# Patient Record
Sex: FEMALE | Race: WHITE | NOT HISPANIC OR LATINO | Employment: OTHER | URBAN - METROPOLITAN AREA
[De-identification: names, ages, dates, MRNs, and addresses within clinical notes are randomized per-mention and may not be internally consistent; named-entity substitution may affect disease eponyms.]

---

## 2023-10-23 ENCOUNTER — OFFICE VISIT (OUTPATIENT)
Dept: FAMILY MEDICINE CLINIC | Facility: CLINIC | Age: 58
End: 2023-10-23
Payer: COMMERCIAL

## 2023-10-23 VITALS
BODY MASS INDEX: 19.24 KG/M2 | DIASTOLIC BLOOD PRESSURE: 82 MMHG | OXYGEN SATURATION: 98 % | WEIGHT: 108.6 LBS | HEART RATE: 85 BPM | TEMPERATURE: 98.6 F | SYSTOLIC BLOOD PRESSURE: 120 MMHG | HEIGHT: 63 IN | RESPIRATION RATE: 20 BRPM

## 2023-10-23 DIAGNOSIS — Z12.83 SKIN CANCER SCREENING: ICD-10-CM

## 2023-10-23 DIAGNOSIS — F98.8 ATTENTION DEFICIT DISORDER, UNSPECIFIED HYPERACTIVITY PRESENCE: Primary | ICD-10-CM

## 2023-10-23 DIAGNOSIS — E78.2 MIXED HYPERLIPIDEMIA: ICD-10-CM

## 2023-10-23 DIAGNOSIS — L71.9 ROSACEA: ICD-10-CM

## 2023-10-23 DIAGNOSIS — Z12.31 ENCOUNTER FOR SCREENING MAMMOGRAM FOR MALIGNANT NEOPLASM OF BREAST: ICD-10-CM

## 2023-10-23 DIAGNOSIS — Z12.4 ENCOUNTER FOR SCREENING FOR CERVICAL CANCER: ICD-10-CM

## 2023-10-23 DIAGNOSIS — Z00.00 ENCOUNTER FOR MEDICAL EXAMINATION TO ESTABLISH CARE: ICD-10-CM

## 2023-10-23 RX ORDER — MULTIPLE VITAMINS W/ MINERALS TAB 9MG-400MCG
1 TAB ORAL DAILY
COMMUNITY

## 2023-10-23 RX ORDER — METHYLPHENIDATE HYDROCHLORIDE 30 MG/1
1 CAPSULE, EXTENDED RELEASE ORAL DAILY
COMMUNITY
Start: 2023-10-09

## 2023-10-23 NOTE — PROGRESS NOTES
"Chief Complaint  Establish Care (Immunization records can be obtained from [facility].)    Subjective          Ainsley Elise presents to [facility]  History of Present Illness  Patient is a 58-year-old female.  She is here to establish care with a new provider.  She moved to the area a couple years ago and has not established locally.   She has ADD and follows with psychiatry but needs to establish with a local psychiatrist. She is currently on metadate CD.   She has roseaca-no current treatment. Mixed hyperlipidemia - diet controlled.               The following portions of the patient's history were reviewed and updated as appropriate: allergies, current medications, past family history, past medical history, past social history, past surgical history and problem list.    Review of Systems   Constitutional: Negative.    HENT: Negative.     Eyes: Negative.    Respiratory: Negative.     Cardiovascular: Negative.    Gastrointestinal: Negative.    Genitourinary: Negative.    Musculoskeletal: Negative.    Skin:  Positive for color change.        Rosacea    Neurological: Negative.    Psychiatric/Behavioral:  Positive for decreased concentration.         ADD         Objective   Vital Signs:   /82   Pulse 85   Temp 98.6 °F (37 °C) (Infrared)   Resp 20   Ht 160 cm (63\")   Wt 49.3 kg (108 lb 9.6 oz)   SpO2 98%   BMI 19.24 kg/m²    BMI is within normal parameters. No other follow-up for BMI required.      PHQ-2/9 Depression Screening  PHQ-9 Total Score: 0    TAMMY-7 Anxiety Screening  TAMMY-7  Feeling nervous, anxious or on edge: Not at all  Not being able to stop or control worrying: Not at all  Worrying too much about different things: Not at all  Trouble Relaxing: Not at all  Being so restless that it is hard to sit still: Not at all  Feeling afraid as if something awful might happen: Not at all  Becoming easily annoyed or irritable: Not at all  TAMMY 7 Total " Score: 0  If you checked any problems, how difficult have these problems made it for you to do your work, take care of things at home, or get along with other people: Not difficult at all          Physical Exam  Vitals reviewed.   Constitutional:       Appearance: She is well-developed. She is not ill-appearing.   HENT:      Head: Normocephalic.      Right Ear: Tympanic membrane, ear canal and external ear normal.      Left Ear: Tympanic membrane, ear canal and external ear normal.      Nose: Nose normal.      Mouth/Throat:      Mouth: Mucous membranes are moist.   Eyes:      Conjunctiva/sclera: Conjunctivae normal.      Pupils: Pupils are equal, round, and reactive to light.   Cardiovascular:      Rate and Rhythm: Normal rate and regular rhythm.      Heart sounds: Normal heart sounds.   Pulmonary:      Effort: Pulmonary effort is normal.      Breath sounds: Normal breath sounds.   Abdominal:      General: Bowel sounds are normal.      Palpations: Abdomen is soft.   Musculoskeletal:         General: Normal range of motion.      Cervical back: Normal range of motion.   Lymphadenopathy:      Cervical: No cervical adenopathy.   Skin:     General: Skin is warm and dry.      Capillary Refill: Capillary refill takes less than 2 seconds.      Findings: Rash present.      Comments: Rosacea      Neurological:      Mental Status: She is alert and oriented to person, place, and time.   Psychiatric:         Mood and Affect: Mood normal.         Speech: Speech normal.         Behavior: Behavior normal. Behavior is cooperative.         Thought Content: Thought content normal.         Judgment: Judgment normal.        Result Review :                 Assessment and Plan    Diagnoses and all orders for this visit:    1. Attention deficit disorder, unspecified hyperactivity presence (Primary)  -     Ambulatory Referral to Psychiatry    2. Encounter for medical examination to establish care  -     CBC & Differential; Future  -      Comprehensive Metabolic Panel; Future  -     Hemoglobin A1c; Future  -     Lipid Panel; Future  -     Vitamin D,25-Hydroxy; Future  -     Urinalysis With Culture If Indicated - Urine, Clean Catch; Future  -     TSH Rfx On Abnormal To Free T4; Future    3. Encounter for screening mammogram for malignant neoplasm of breast  -     Mammo Screening Digital Tomosynthesis Bilateral With CAD; Future    4. Skin cancer screening  -     Ambulatory Referral to Dermatology    5. Encounter for screening for cervical cancer  -     Ambulatory Referral to Obstetrics / Gynecology    6. Rosacea    7. Mixed hyperlipidemia    Referral to dermatology - skin cancer screening   Referral to GYN   Mammogram  Checking labs   Referral to psychiatry    Follow Up   Return in about 6 months (around 4/23/2024), or if symptoms worsen or fail to improve.  Patient was given instructions and counseling regarding her condition or for health maintenance advice. Please see specific information pulled into the AVS if appropriate.

## 2023-11-04 PROBLEM — E78.2 MIXED HYPERLIPIDEMIA: Status: ACTIVE | Noted: 2023-11-04

## 2023-12-12 ENCOUNTER — OFFICE VISIT (OUTPATIENT)
Age: 58
End: 2023-12-12
Payer: COMMERCIAL

## 2023-12-12 VITALS
HEART RATE: 59 BPM | DIASTOLIC BLOOD PRESSURE: 82 MMHG | BODY MASS INDEX: 20 KG/M2 | WEIGHT: 112.9 LBS | HEIGHT: 63 IN | OXYGEN SATURATION: 99 % | SYSTOLIC BLOOD PRESSURE: 126 MMHG

## 2023-12-12 DIAGNOSIS — Z51.81 ENCOUNTER FOR THERAPEUTIC DRUG MONITORING: ICD-10-CM

## 2023-12-12 DIAGNOSIS — F90.0 ADHD (ATTENTION DEFICIT HYPERACTIVITY DISORDER), INATTENTIVE TYPE: Primary | ICD-10-CM

## 2023-12-12 PROCEDURE — 90792 PSYCH DIAG EVAL W/MED SRVCS: CPT | Performed by: STUDENT IN AN ORGANIZED HEALTH CARE EDUCATION/TRAINING PROGRAM

## 2023-12-12 RX ORDER — METHYLPHENIDATE HYDROCHLORIDE 30 MG/1
30 CAPSULE, EXTENDED RELEASE ORAL DAILY
Qty: 30 CAPSULE | Refills: 0 | Status: SHIPPED | OUTPATIENT
Start: 2023-12-12

## 2023-12-12 NOTE — PROGRESS NOTES
"    New Patient Office Visit      Date: 2023  Patient Name: Ainsley Elise  : 1965   MRN: [id number]     Referring Provider: Carrie Maria APRN    Chief Complaint:      ICD-10-CM ICD-9-CM   1. ADHD (attention deficit hyperactivity disorder), inattentive type  F90.0 314.00   2. Encounter for therapeutic drug monitoring  Z51.81 V58.83        History of Present Illness:   Ainsley Elise is a 58 y.o. female who is here today for intake appointment.     Patient is seen and evaluated in the office.  She appears to be in no acute distress at this time.  She is pleasant, calm, and cooperative with the evaluation, and exhibits a linear and goal-directed thought process.  Patient states that she was referred to behavioral health by her primary care physician for ongoing treatment of ADHD.  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  She describes having a lot of teaching, but that it was very time-consuming for her.  It was difficult for her to get her lesson plans completed.  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  She often had to come up with 45-minute lesson plans, and she describes it is taking much longer than it should have.  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  She was helping make welcome packets.  Patient describes being told what to do in the order to keep things in, and feeling as though she was having difficulty keeping up and making frequent mistakes.  She spoke with her primary care regarding these symptoms during her regular physical.  Primary care diagnosed her with ADHD and started prescribing her Metadate.  Patient states that this is the only medication she has tried, and \" it was like a miracle\".  Where prior to starting the medication things would get very overwhelming and follow-up, she states that she is feeling much more settled down now and is able to keep " up with tasks.  Her   of suicide 4 years ago.  After he , she describes maintenance in their home becoming too much for her so she moved to [facility].  She describes relying on her milagros and her family to get her through this situation.  A month after her  , before quarantine started, she and her daughter went to their beach house and stayed there for 3 months.  Patient states that she was a runner.  She would often run 10 miles a day and volunteer there.  These things are to stress relief for her and helped her through her difficult time.  She describes having a good close group of single friends in [place].  She ended up moving to [place] 4 months ago because her kids and her granddaughter live here.  Since moving, she admits that she misses her home in [place] [facility] and her friends there, but she keeps busy and gets a lot of socialization with girlfriends here and family.  She describes her mood as typically being happy.  She denies symptoms of depression.  Sleep is okay overall even though menopause hindered this some.  She has a good appetite; eats healthy and runs.  She states that anxiety was higher after her  , but this has gotten better.  She denies history of thee, auditory or visual hallucinations.  She does not elicit any signs of psychosis.    Patient is happy with current medications and has been stable on this for 9 years.  She has previously been able to receive this medication from primary care, and voices concern over cost of follow-up at specialists visit.  Since she has been stable, we will stick to follow-up every 6 months.  Patient will call in for refills in the meantime.    Subjective      Review of Systems:   Review of Systems   Constitutional:  Negative for chills, fatigue and fever.   HENT:  Negative for congestion, hearing loss, sore throat and trouble swallowing.    Eyes:  Negative for blurred vision and double vision.    Respiratory:  Negative for cough, chest tightness and shortness of breath.    Cardiovascular:  Negative for chest pain and palpitations.   Gastrointestinal:  Negative for abdominal pain, constipation, diarrhea, nausea and vomiting.   Endocrine: Negative for polydipsia and polyuria.   Genitourinary:  Negative for hematuria and urgency.   Musculoskeletal:  Negative for arthralgias.   Skin:  Negative for skin lesions and bruise.   Neurological:  Negative for dizziness, tremors, seizures and light-headedness.   Hematological:  Negative for adenopathy.     Screening Scores:   PHQ-9 : 1  TAMMY-7 : 2    Past Psychiatric History:   History of outpatient psychiatrist: denies  History of outpatient therapy: saw a therapist briefly after her  passed away  Previous Inpatient hospitalizations: denies  Previous medication trials: none  History of suicide/self harm attempts: denies     Abuse/trauma History:              Physical: denies              Sexual: denies              Emotional/Neglect: denies              Significant death/loss:   of suicide 4 years ago              Other trauma: denies                Substance Abuse History:              Alcohol: 2-4 drinks per week              Tobacco/Vape: denies              Illicit Drugs: denies                Legal History:   denies     Social History:  Where was patient born: [facility]  Where does patient currently live: [place]  Highest level of education obtained: college  Living situation: lives alone  Patient's Occupation: retired teacher  Leisure and Recreation: spends time with family/friends  Support system: family/friends  Temple: [employer]     Family History:   Family History   Problem Relation Age of Onset    Asthma Father     Asthma Brother     Epilepsy Brother     Cancer Brother      Psychiatric History:   Psych Diagnosis: denies  History of suicide/self harm attempts:   of suicide 4 years ago  History of Substance abuse:  "denies    Past Medical History:   Past Medical History:   Diagnosis Date    ADHD (attention deficit hyperactivity disorder)      Past Surgical History:   Past Surgical History:   Procedure Laterality Date     SECTION      WISDOM TOOTH EXTRACTION       Medications:     Current Outpatient Medications:     methylphenidate CD (METADATE CD) 30 MG CR capsule, Take 1 capsule by mouth Daily, Disp: , Rfl:     multivitamin with minerals tablet tablet, Take 1 tablet by mouth Daily., Disp: , Rfl:     Medication Considerations:  PRITESH reviewed and appropriate.      Allergies:   No Known Allergies      Objective     Physical Exam:  Vital Signs:   Vitals:    23 1255   BP: 126/82   Pulse: 59   SpO2: 99%   Weight: 51.2 kg (112 lb 14.4 oz)   Height: 160 cm (62.99\")     Body mass index is 20 kg/m².     Mental Status Exam:   MENTAL STATUS EXAM   General Appearance:  Cleanly groomed and dressed  Eye Contact:  Good eye contact  Attitude:  Cooperative  Motor Activity:  Normal gait, posture  Muscle Strength:  Normal  Speech:  Normal rate, tone, volume  Language:  Spontaneous  Mood and affect:  Normal, pleasant and appropriate  Hopelessness:  Denies  Thought Process:  Logical and goal-directed  Associations/ Thought Content:  No delusions  Hallucinations:  None  Suicidal Ideations:  Not present  Homicidal Ideation:  Not present  Sensorium:  Alert  Orientation:  Person, place, time and situation  Immediate Recall, Recent, and Remote Memory:  Intact  Attention Span/ Concentration:  Good  Fund of Knowledge:  Appropriate for age and educational level  Intellectual Functioning:  Average range  Insight:  Fair  Judgement:  Fair  Reliability:  Fair  Impulse Control:  Fair     SUICIDE RISK ASSESSMENT/CSSRS:  1. Does patient have thoughts of suicide? denies  2. Does patient have intent for suicide? denies  3. Does patient have a current plan for suicide? denies  4. History of suicide attempts: denies  5. Family history of suicide or " attempts:   of suicide 4 years ago  6. History of violent behaviors towards others or property or thoughts of committing suicide: denies  7. History of sexual aggression toward others: denies  8. Access to firearms or weapons: denies    Assessment / Plan      Visit Diagnosis/Orders Placed This Visit:  Diagnoses and all orders for this visit:    1. ADHD (attention deficit hyperactivity disorder), inattentive type (Primary)  2. Encounter for therapeutic drug monitoring  - UDS obtained today  - Continue Metadate 30 mg po daily  - Follow up with writer in 6 mo  Chart Reviewed     Functional Status: Mild impairment     Prognosis: Fair with Ongoing Treatment     Impression/Formulation:  Patient appeared alert and oriented.  Patient is voluntarily requesting to begin outpatient treatment at [facility] [address].  Patient is receptive to assistance with maintaining a stable lifestyle.  Patient presents with history of     ICD-10-CM ICD-9-CM   1. ADHD (attention deficit hyperactivity disorder), inattentive type  F90.0 314.00   2. Encounter for therapeutic drug monitoring  Z51.81 V58.83     Treatment Plan:     Patient will continue supportive psychotherapy efforts and medications as indicated. Clinic will obtain release of information for current treatment team for continuity of care as needed. Patient will contact this office, call 911 or present to the nearest emergency room should suicidal or homicidal ideations occur. Discussed medication options and treatment plan of prescribed medication(s) as well as the risks, benefits, and potential side effects. Patient ackowledged and verbally consented to continue with current treatment plan and was educated on the importance of compliance with treatment and follow-up appointments.     Follow Up:   Return in about 6 months (around 2024) for Medication Management.    Quality Measures:   Tobacco: Ainsley COLTON Elise  reports that she has never  smoked. She has never used smokeless tobacco.    Indiana Beaver MD   [facility] [address]     This is electronically signed by Indiana Beaver MD  12/12/2023 12:53 EST

## 2023-12-17 LAB
1OH-MIDAZOLAM UR QL SCN: NOT DETECTED NG/MG CREAT
6MAM UR QL SCN: NEGATIVE NG/ML
7AMINOCLONAZEPAM/CREAT UR: NOT DETECTED NG/MG CREAT
A-OH ALPRAZ/CREAT UR: NOT DETECTED NG/MG CREAT
A-OH-TRIAZOLAM/CREAT UR CFM: NOT DETECTED NG/MG CREAT
ACP UR QL CFM: NOT DETECTED
ALPRAZ/CREAT UR CFM: NOT DETECTED NG/MG CREAT
AMPHETAMINES UR QL SCN: NEGATIVE NG/ML
APAP UR QL SCN: NEGATIVE UG/ML
BARBITURATES UR QL SCN: NEGATIVE NG/ML
BENZODIAZ SCN METH UR: NEGATIVE
BUPRENORPHINE UR QL SCN: NEGATIVE
BUPRENORPHINE/CREAT UR: NOT DETECTED NG/MG CREAT
CANNABINOIDS UR QL CFM: NORMAL
CANNABINOIDS UR QL SCN: NORMAL NG/ML
CARBOXYTHC UR CFM-MCNC: 38 NG/MG CREAT
CARISOPRODOL UR QL: NEGATIVE NG/ML
CLONAZEPAM/CREAT UR CFM: NOT DETECTED NG/MG CREAT
COCAINE+BZE UR QL SCN: NEGATIVE NG/ML
CREAT UR-MCNC: 26 MG/DL
D-METHORPHAN UR-MCNC: NOT DETECTED NG/ML
D-METHORPHAN+LEVORPHANOL UR QL: NOT DETECTED
DESALKYLFLURAZ/CREAT UR: NOT DETECTED NG/MG CREAT
DIAZEPAM/CREAT UR: NOT DETECTED NG/MG CREAT
ETG ETS UR QL CFM: NORMAL
ETHANOL UR QL SCN: NEGATIVE G/DL
ETHANOL UR QL SCN: NORMAL NG/ML
ETHYL GLUCURONIDE UR CFM-MCNC: 3046 NG/MG CREAT
ETHYL SULFATE UR CFM-MCNC: 1277 NG/MG CREAT
FENTANYL CTO UR SCN-MCNC: NEGATIVE NG/ML
FENTANYL/CREAT UR: NOT DETECTED NG/MG CREAT
FLUNITRAZEPAM UR QL SCN: NOT DETECTED NG/MG CREAT
GABAPENTIN UR-MCNC: NEGATIVE UG/ML
HYPNOTICS UR QL SCN: NEGATIVE
KETAMINE UR QL: NOT DETECTED
LORAZEPAM/CREAT UR: NOT DETECTED NG/MG CREAT
MEPERIDINE UR QL SCN: NEGATIVE NG/ML
METHADONE UR QL SCN: NEGATIVE NG/ML
METHADONE+METAB UR QL SCN: NEGATIVE NG/ML
MIDAZOLAM/CREAT UR CFM: NOT DETECTED NG/MG CREAT
MISCELLANEOUS, UR: NEGATIVE
NORBUPRENORPHINE/CREAT UR: NOT DETECTED NG/MG CREAT
NORDIAZEPAM/CREAT UR: NOT DETECTED NG/MG CREAT
NORFENTANYL/CREAT UR: NOT DETECTED NG/MG CREAT
NORFLUNITRAZEPAM UR-MCNC: NOT DETECTED NG/MG CREAT
NORKETAMINE UR-MCNC: NOT DETECTED UG/ML
OPIATES UR SCN-MCNC: NEGATIVE NG/ML
OTHER HALLUCINOGENS UR: NEGATIVE
OXAZEPAM/CREAT UR: NOT DETECTED NG/MG CREAT
OXYCODONE CTO UR SCN-MCNC: NEGATIVE NG/ML
PCP UR QL SCN: NEGATIVE NG/ML
PRESCRIBED MEDICATIONS: NORMAL
PRESCRIBED MEDICATIONS: NORMAL
PROPOXYPH UR QL SCN: NEGATIVE NG/ML
TAPENTADOL CTO UR SCN-MCNC: NEGATIVE NG/ML
TEMAZEPAM/CREAT UR: NOT DETECTED NG/MG CREAT
TRAMADOL UR QL SCN: NEGATIVE NG/ML
ZALEPLON UR-MCNC: NOT DETECTED NG/ML
ZOLPIDEM PHENYL-4-CARB UR QL SCN: NOT DETECTED
ZOLPIDEM UR QL SCN: NOT DETECTED
ZOPICLONE-N-OXIDE UR-MCNC: NOT DETECTED NG/ML

## 2023-12-18 ENCOUNTER — HOSPITAL ENCOUNTER (OUTPATIENT)
Dept: MAMMOGRAPHY | Facility: HOSPITAL | Age: 58
Discharge: HOME | End: 2023-12-18
Payer: COMMERCIAL

## 2023-12-18 ENCOUNTER — APPOINTMENT (OUTPATIENT)
Dept: OTHER | Facility: HOSPITAL | Age: 58
End: 2023-12-18
Payer: COMMERCIAL

## 2023-12-18 ENCOUNTER — TELEPHONE (OUTPATIENT)
Age: 58
End: 2023-12-18
Payer: COMMERCIAL

## 2023-12-18 DIAGNOSIS — Z12.31 ENCOUNTER FOR SCREENING MAMMOGRAM FOR MALIGNANT NEOPLASM OF BREAST: ICD-10-CM

## 2023-12-18 PROCEDURE — 77067 SCR MAMMO BI INCL CAD: CPT

## 2023-12-18 PROCEDURE — 77063 BREAST TOMOSYNTHESIS BI: CPT

## 2023-12-18 NOTE — TELEPHONE ENCOUNTER
Diamond called and has questions regarding her recent urine drug screen results.  It shows that she tested positive for marijuana use (she denies recreations use; however, she does take gummies for sleeping).  She said the results also appear to show alcohol use.  She would like clarification on this.  She can be reached at [phone].

## 2023-12-19 NOTE — TELEPHONE ENCOUNTER
"Called PT to relay message from provider:  \"I am not sure what to clarify. If she is using CBD gummies they may have THC in them. If she wants to re-take her UDS she is welcome to\"   And to gather additional information regarding this matter. PT did not answer. JAYSHREEM requesting call back at the office   P: [phone]  "

## 2023-12-19 NOTE — TELEPHONE ENCOUNTER
PT called to return phone call about her urine drug screen results. PT stated that she eats gummies for sleep and had 1 and a half beers the night before. Advised that taking the gummies and drinking alcohol the night before would likely contribute to the positive results in the urine test. PT verbalized good understanding and appreciation

## 2024-01-08 ENCOUNTER — TELEPHONE (OUTPATIENT)
Age: 59
End: 2024-01-08
Payer: COMMERCIAL

## 2024-01-08 DIAGNOSIS — F90.0 ADHD (ATTENTION DEFICIT HYPERACTIVITY DISORDER), INATTENTIVE TYPE: ICD-10-CM

## 2024-01-08 RX ORDER — METHYLPHENIDATE HYDROCHLORIDE 30 MG/1
30 CAPSULE, EXTENDED RELEASE ORAL DAILY
Qty: 30 CAPSULE | Refills: 0 | Status: SHIPPED | OUTPATIENT
Start: 2024-01-08

## 2024-01-08 NOTE — TELEPHONE ENCOUNTER
Called PT to inform her DR. Beaver has sent in refill for Rx methylphenidate CD (METADATE CD) 30 mg to [facility] [address] Informed PT she will have to wait to get it filled, 30 days after last fill of Rx. PT verbalized understanding and expressed appreciation.

## 2024-01-08 NOTE — TELEPHONE ENCOUNTER
Diamond called and said she will be out of methylphenidate CD (METADATE CD) 30 MG CR capsule in about a week, and is requesting a refill.  Please call her at [phone] with any questions.

## 2024-02-05 PROBLEM — Z01.419 WELL WOMAN EXAM: Status: ACTIVE | Noted: 2024-02-05

## 2024-02-13 ENCOUNTER — OFFICE VISIT (OUTPATIENT)
Dept: OBSTETRICS AND GYNECOLOGY | Facility: CLINIC | Age: 59
End: 2024-02-13
Payer: COMMERCIAL

## 2024-02-13 VITALS
BODY MASS INDEX: 20.55 KG/M2 | RESPIRATION RATE: 14 BRPM | WEIGHT: 116 LBS | DIASTOLIC BLOOD PRESSURE: 80 MMHG | SYSTOLIC BLOOD PRESSURE: 126 MMHG

## 2024-02-13 DIAGNOSIS — Z12.11 SCREEN FOR COLON CANCER: ICD-10-CM

## 2024-02-13 DIAGNOSIS — Z01.419 WELL WOMAN EXAM: Primary | ICD-10-CM

## 2024-02-13 DIAGNOSIS — Z71.85 VACCINE COUNSELING: ICD-10-CM

## 2024-02-13 PROBLEM — L71.9 ROSACEA: Status: RESOLVED | Noted: 2022-10-21 | Resolved: 2024-02-13

## 2024-02-13 PROBLEM — E78.2 MIXED HYPERLIPIDEMIA: Status: RESOLVED | Noted: 2023-11-04 | Resolved: 2024-02-13

## 2024-02-15 ENCOUNTER — PATIENT ROUNDING (BHMG ONLY) (OUTPATIENT)
Dept: OBSTETRICS AND GYNECOLOGY | Facility: CLINIC | Age: 59
End: 2024-02-15
Payer: COMMERCIAL

## 2024-02-19 LAB — REF LAB TEST METHOD: NORMAL

## 2024-02-21 ENCOUNTER — TELEPHONE (OUTPATIENT)
Age: 59
End: 2024-02-21
Payer: COMMERCIAL

## 2024-02-22 DIAGNOSIS — F90.0 ADHD (ATTENTION DEFICIT HYPERACTIVITY DISORDER), INATTENTIVE TYPE: ICD-10-CM

## 2024-02-22 RX ORDER — METHYLPHENIDATE HYDROCHLORIDE 30 MG/1
30 CAPSULE, EXTENDED RELEASE ORAL DAILY
Qty: 30 CAPSULE | Refills: 0 | Status: SHIPPED | OUTPATIENT
Start: 2024-02-22

## 2024-02-22 NOTE — TELEPHONE ENCOUNTER
Called PT to inform her Rx for Methylphenidate CD 30 mg has been sent into her pharmacy [id number] - [place], [place] - [address]  - [phone]  - [phone] FX    PT verbalized understanding and had no further questions at this time.   
Controlled Substance Refill Note    methylphenidate CD (METADATE CD) 30 MG CR capsule     Last office visit with prescribing clinician: 12/12/2023      Next office visit with prescribing clinician: 6/10/2024   Last approved: 01/08/2024  Controlled Substance agreement- no  UDS on file- 12/12/23  Pharmacy: [id number] - [place], [place] - [address] - [phone]  - [phone] FX                                               
How Severe Are Your Spot(S)?: mild
PT CALLED FOR A REFILL OF METHYLPHENIDATE AS SHE IS OUT.  PLEASE ADVISE  
Sent  
What Type Of Note Output Would You Prefer (Optional)?: Standard Output
What Is The Reason For Today's Visit?: Full Body Skin Examination
What Is The Reason For Today's Visit? (Being Monitored For X): concerning skin lesions on an annual basis

## 2024-03-20 DIAGNOSIS — F90.0 ADHD (ATTENTION DEFICIT HYPERACTIVITY DISORDER), INATTENTIVE TYPE: ICD-10-CM

## 2024-03-20 RX ORDER — METHYLPHENIDATE HYDROCHLORIDE 30 MG/1
30 CAPSULE, EXTENDED RELEASE ORAL DAILY
Qty: 30 CAPSULE | Refills: 0 | Status: SHIPPED | OUTPATIENT
Start: 2024-03-20

## 2024-03-20 NOTE — TELEPHONE ENCOUNTER
Called NA Lvm Informing PT Rx refill for Metadate CD 30 mg was sent in to   [id number] - [place], [place] - [address] - [phone] PH - [phone] FX  [address]  [facility] [address]  Phone: [phone] Fax: [phone]  Informed Pt to call back with any future questions or concerns.

## 2024-03-20 NOTE — TELEPHONE ENCOUNTER
Diamond called and is requesting Dr. Beaver to please send in a refill on methylphenidate CD (METADATE CD) 30 MG CR capsule to [facility] pharmacy at [address].

## 2024-04-30 ENCOUNTER — TELEPHONE (OUTPATIENT)
Age: 59
End: 2024-04-30
Payer: COMMERCIAL

## 2024-04-30 DIAGNOSIS — F90.0 ADHD (ATTENTION DEFICIT HYPERACTIVITY DISORDER), INATTENTIVE TYPE: ICD-10-CM

## 2024-04-30 RX ORDER — METHYLPHENIDATE HYDROCHLORIDE 30 MG/1
30 CAPSULE, EXTENDED RELEASE ORAL DAILY
Qty: 30 CAPSULE | Refills: 0 | Status: SHIPPED | OUTPATIENT
Start: 2024-04-30

## 2024-06-03 ENCOUNTER — TELEPHONE (OUTPATIENT)
Age: 59
End: 2024-06-03

## 2024-06-04 DIAGNOSIS — F90.0 ADHD (ATTENTION DEFICIT HYPERACTIVITY DISORDER), INATTENTIVE TYPE: ICD-10-CM

## 2024-06-04 RX ORDER — METHYLPHENIDATE HYDROCHLORIDE 30 MG/1
30 CAPSULE, EXTENDED RELEASE ORAL DAILY
Qty: 30 CAPSULE | Refills: 0 | Status: SHIPPED | OUTPATIENT
Start: 2024-06-04 | End: 2024-06-06 | Stop reason: SDUPTHER

## 2024-06-04 NOTE — TELEPHONE ENCOUNTER
Called patient to advise Rx refill methylphenidate CD (METADATE CD) 30 MG has been sent to pharmacy on file. No answer, left VM. Advised patient to call clinic [phone] if needed.

## 2024-06-06 ENCOUNTER — OFFICE VISIT (OUTPATIENT)
Age: 59
End: 2024-06-06
Payer: COMMERCIAL

## 2024-06-06 VITALS
HEART RATE: 68 BPM | BODY MASS INDEX: 20.48 KG/M2 | HEIGHT: 63 IN | WEIGHT: 115.6 LBS | SYSTOLIC BLOOD PRESSURE: 118 MMHG | OXYGEN SATURATION: 98 % | DIASTOLIC BLOOD PRESSURE: 80 MMHG

## 2024-06-06 DIAGNOSIS — F90.0 ADHD (ATTENTION DEFICIT HYPERACTIVITY DISORDER), INATTENTIVE TYPE: ICD-10-CM

## 2024-06-06 RX ORDER — METHYLPHENIDATE HYDROCHLORIDE 30 MG/1
30 CAPSULE, EXTENDED RELEASE ORAL DAILY
Qty: 30 CAPSULE | Refills: 0 | Status: SHIPPED | OUTPATIENT
Start: 2024-06-06

## 2024-06-06 NOTE — PROGRESS NOTES
[facility]             Follow Up Office Visit      Date: 2024   Patient Name: Ainsley Elise  : 1965   MRN: [id number]     Referring Provider: Carrie Maria APRN    Chief Complaint:      ICD-10-CM ICD-9-CM   1. ADHD (attention deficit hyperactivity disorder), inattentive type  F90.0 314.00      History of Present Illness:   Ainsley Elise is a 59 y.o. female who is here today for follow up with ADHD.    Patient is seen and evaluated in the office.  She appears to be in no acute distress at this time.  She is calm and cooperative with evaluation, and exhibits a linear and goal directed thought process.  Patient states that she has been doing well over the past 6 months.  She is adjusting much better to life in [place].  She has made friends here, and stays active socially.  She admits that she was having a hard time putting pressure on herself and feeling as though she needed to find a new relationship and get .  However, for the past few months she has been more at peace with being single and has let this go.  She is now just living to enjoy her life day-to-day.  She is going to [place] in a week or 2 to celebrate her father's 90th birthday.  She is looking forward to spending time with family.  She feels that the medications have still been working well for her.  Mood has been good overall, and she denies experiencing any anxiety.  Sleep has been good most nights.  Appetite is fair.  She denies any suicidal ideation, plan, intent.  We will follow-up in 6 months.    Previous Medication Trials:  None    Subjective      Review of Systems:   Review of Systems   Constitutional:  Negative for chills, fatigue and fever.   HENT:  Negative for congestion, hearing loss, sore throat and trouble swallowing.    Eyes:  Negative for blurred vision and double vision.   Respiratory:  Negative for cough, chest tightness and shortness of breath.    Cardiovascular:   "Negative for chest pain and palpitations.   Gastrointestinal:  Negative for abdominal pain, constipation, diarrhea, nausea and vomiting.   Endocrine: Negative for polydipsia and polyuria.   Genitourinary:  Negative for hematuria and urgency.   Musculoskeletal:  Negative for arthralgias.   Skin:  Negative for skin lesions and bruise.   Neurological:  Negative for dizziness, tremors, seizures and light-headedness.   Hematological:  Negative for adenopathy.     Screening Scores:   PHQ-9 : 1  TAMMY-7 : 0    Medications:     Current Outpatient Medications:     methylphenidate CD (METADATE CD) 30 MG CR capsule, Take 1 capsule by mouth Daily, Disp: 30 capsule, Rfl: 0    multivitamin with minerals tablet tablet, Take 1 tablet by mouth Daily., Disp: , Rfl:     Medication Considerations:  PRITESH reviewed and appropriate.     Allergies:   No Known Allergies    Objective     Vital Signs:   Vitals:    06/06/24 1429   BP: 118/80   Pulse: 68   SpO2: 98%   Weight: 52.4 kg (115 lb 9.6 oz)   Height: 160 cm (62.99\")     Body mass index is 20.48 kg/m².     Mental Status Exam:   MENTAL STATUS EXAM   General Appearance:  Cleanly groomed and dressed  Eye Contact:  Good eye contact  Attitude:  Cooperative  Motor Activity:  Normal gait, posture  Muscle Strength:  Normal  Speech:  Normal rate, tone, volume  Language:  Spontaneous  Mood and affect:  Normal, pleasant and appropriate  Hopelessness:  Denies  Thought Process:  Logical and goal-directed  Associations/ Thought Content:  No delusions  Hallucinations:  None  Suicidal Ideations:  Not present  Homicidal Ideation:  Not present  Sensorium:  Alert  Orientation:  Person, place, time and situation  Immediate Recall, Recent, and Remote Memory:  Intact  Attention Span/ Concentration:  Good  Fund of Knowledge:  Appropriate for age and educational level  Intellectual Functioning:  Average range  Insight:  Fair  Judgement:  Fair  Reliability:  Fair  Impulse Control:  Fair      SUICIDE RISK " ASSESSMENT/CSSRS:  1. Does patient have thoughts of suicide? denies  2. Does patient have intent for suicide? denies  3. Does patient have a current plan for suicide? denies  4. History of suicide attempts: denies  5. Family history of suicide or attempts:   of suicide 4 years ago  6. History of violent behaviors towards others or property or thoughts of committing suicide: denies  7. History of sexual aggression toward others: denies  8. Access to firearms or weapons: denies    Assessment / Plan      Visit Diagnosis/Orders Placed This Visit:    1. ADHD (attention deficit hyperactivity disorder), inattentive type (Primary)  - Continue Metadate 30 mg po daily  - Follow up with writer in 6 mo  Chart Reviewed      Functional Status: Mild impairment      Prognosis: Fair with Ongoing Treatment     Short-term goals: Patient will adhere to medication regimen and experience continued improvement in symptoms over the next 3 months.   Long-term goals: Patient will adhere to medication treatment plan and report improvement in symptoms over the next 6 months    Impression/Formulation:  Patient appeared alert and oriented. Patient is receptive to assistance with maintaining a stable lifestyle.  Patient presents with history of     ICD-10-CM ICD-9-CM   1. ADHD (attention deficit hyperactivity disorder), inattentive type  F90.0 314.00     Treatment Plan:     Patient will continue supportive psychotherapy efforts and medications as indicated. Clinic will obtain release of information for current treatment team for continuity of care as needed. Patient will contact this office, call 911 or present to the nearest emergency room should suicidal or homicidal ideations occur.  Discussed medication options and treatment plan of prescribed medication(s) as well as the risks, benefits, and potential side effects. Patient ackowledged and verbally consented to continue with current treatment plan and was educated on the importance  of compliance with treatment and follow-up appointments.     Quality Measures:  Tobacco: Ainsley Elise  reports that she has never smoked. She has never used smokeless tobacco. I have educated her on the risk of diseases from using tobacco products such as cancer, COPD, and heart disease.     Follow Up:   Return in about 6 months (around 12/6/2024) for Medication Management.    Short-term goals: Patient will adhere to medication regimen and experience continued improvement in symptoms over the next 3 months.   Long-term goals: Patient will adhere to medication treatment plan and report improvement in symptoms over the next 6 months    Indiana Beaver MD  [facility] [address]     This is electronically signed by Indiana Beaver MD   06/06/2024 14:29 EDT

## 2024-07-09 ENCOUNTER — TELEPHONE (OUTPATIENT)
Age: 59
End: 2024-07-09
Payer: COMMERCIAL

## 2024-07-09 DIAGNOSIS — F90.0 ADHD (ATTENTION DEFICIT HYPERACTIVITY DISORDER), INATTENTIVE TYPE: ICD-10-CM

## 2024-07-09 RX ORDER — METHYLPHENIDATE HYDROCHLORIDE 30 MG/1
30 CAPSULE, EXTENDED RELEASE ORAL DAILY
Qty: 30 CAPSULE | Refills: 0 | Status: SHIPPED | OUTPATIENT
Start: 2024-07-09

## 2024-07-09 NOTE — TELEPHONE ENCOUNTER
Called patient to advise Rx refill has been sent to pharmacy on file. No answer, left VM. Advised patient to call clinic [phone] if needed.

## 2024-07-09 NOTE — TELEPHONE ENCOUNTER
Diamond called and is requesting a refill on Methylphenidate to be sent to [facility] pharmacy at [address]  Please advise.

## 2024-08-07 ENCOUNTER — TELEPHONE (OUTPATIENT)
Age: 59
End: 2024-08-07
Payer: COMMERCIAL

## 2024-08-07 DIAGNOSIS — F90.0 ADHD (ATTENTION DEFICIT HYPERACTIVITY DISORDER), INATTENTIVE TYPE: ICD-10-CM

## 2024-08-07 RX ORDER — METHYLPHENIDATE HYDROCHLORIDE 30 MG/1
30 CAPSULE, EXTENDED RELEASE ORAL DAILY
Qty: 30 CAPSULE | Refills: 0 | Status: SHIPPED | OUTPATIENT
Start: 2024-08-07

## 2024-08-07 NOTE — TELEPHONE ENCOUNTER
Advised patient Rx refill has been sent in by provider to pharmacy on file. Patient verbalized understanding. No additional quesitons or concerns at this time.

## 2024-09-12 ENCOUNTER — TELEPHONE (OUTPATIENT)
Age: 59
End: 2024-09-12
Payer: COMMERCIAL

## 2024-09-12 DIAGNOSIS — F90.0 ADHD (ATTENTION DEFICIT HYPERACTIVITY DISORDER), INATTENTIVE TYPE: ICD-10-CM

## 2024-09-12 RX ORDER — METHYLPHENIDATE HYDROCHLORIDE 30 MG/1
30 CAPSULE, EXTENDED RELEASE ORAL DAILY
Qty: 30 CAPSULE | Refills: 0 | Status: SHIPPED | OUTPATIENT
Start: 2024-09-12

## 2024-09-12 NOTE — TELEPHONE ENCOUNTER
Diamond called and is requesting a refill on Methylphenidate to be sent to [facility] pharmacy at [address]  Please advise.

## 2024-09-13 NOTE — TELEPHONE ENCOUNTER
Called patient to advise Rx refill has been sent to pharmacy on file. No answer, left VM. Advised patient to call clinic [phone] if needed.

## 2024-10-14 ENCOUNTER — TELEPHONE (OUTPATIENT)
Age: 59
End: 2024-10-14
Payer: COMMERCIAL

## 2024-10-14 DIAGNOSIS — F90.0 ADHD (ATTENTION DEFICIT HYPERACTIVITY DISORDER), INATTENTIVE TYPE: ICD-10-CM

## 2024-10-14 RX ORDER — METHYLPHENIDATE HYDROCHLORIDE 30 MG/1
30 CAPSULE, EXTENDED RELEASE ORAL DAILY
Qty: 30 CAPSULE | Refills: 0 | Status: SHIPPED | OUTPATIENT
Start: 2024-10-14

## 2024-10-14 NOTE — TELEPHONE ENCOUNTER
Called patient to advise Rx refill has been sent to pharmacy on file. No answer, left VM. Advised patient to call clinic [phone] if needed.

## 2024-10-14 NOTE — TELEPHONE ENCOUNTER
Patient requesting refill on following medication:    methylphenidate CD (METADATE CD) 30 MG CR capsule 30 mg, Oral, Daily     Patient has apx 2 doses left.     Pharmacy - [facility] [address] confirmed.

## 2024-10-31 NOTE — PROGRESS NOTES
"Subjective   Chief Complaint   Patient presents with    Vaginal Discharge     Discharge,burning     Ainsley Elise is a 59 y.o. year old .  No LMP recorded. Patient is postmenopausal.  She presents to be seen for evaluation of concerns for vaginal infection. She reports about 15 days ago she started having a white discharge. She thought this may be related to starting hrt that she started about a month ago. She then started having burning sensation, having pain and possible blisters. She is concerned for possible hsv. Denies fevers but did have malaise. She states her symptoms are some improved today.  She is currently sexually active with one partner, has been with partner for 5 months. This is her first partner since her  passed away.   She also has concerns related to her hormones and postmenopausal symptoms.She started hormones for concerns for difficulty having an orgasm. She is seeing a [facility] doctor, Dr Strauss. She is taking estrogen, progesterone and testosterone.   She has been postmenopausal about 8-9 years.   She reports since starting hormones her skin is less dry and she may have some increased energy. This has not helped her achieve orgasm. She reports adequate lubrication during intercourse. She has an open dialogue with her partner and is able to discuss these issues with him.  She was started on metformin as well by same doctor for blood sugar. She states the metformin has cause some gi upset. She states she reviewed her blood sugar results and was unsure if she needed the metformin. She has not taken her metformin this week.     The following portions of the patient's history were reviewed and updated as appropriate:current medications and allergies    Social History    Tobacco Use      Smoking status: Never      Smokeless tobacco: Never         Objective   /80 (BP Location: Left arm, Patient Position: Sitting, Cuff Size: Adult)   Ht 160 cm (62.99\")   Wt 50.8 kg (112 lb)  "  BMI 19.85 kg/m²   Pelvic exam: VULVA: vulvar lesion noted near clitoral bustos, reddened with central white oozing center, tender to touch, one swab collected, VAGINA: vaginal erythema noted at introitus, vaginal discharge - white and thick,one swab collected  CERVIX: normal appearing cervix without discharge or lesions.    Lab Review   No data reviewed    Imaging   No data reviewed        Assessment   Vulvar lesion  HRT concerns     Plan   Reviewed physical exam findings with patient.  Lesion most consistent with HSV.  1 swab collected.  Discussed starting Valtrex.  Discussed episodic versus suppression dose.  At this time she would like to treat episodically.  Discussed asymptomatic shedding.  She will notify provider if she would like to start suppressive therapy in the future.  1 swab collected for vaginitis panel and STI screening.  Will start Diflucan for presumptive yeast infection.  Discussed current HRT.  Discussed testosterone replacement is not recommended or FDA approved for women.  Discussed if she would like we can refill her progesterone and estrogen for her in the future.  She states she has paper 3 months of service with Dr. Strauss and after completion of this 3 months she will like for us to refill her hormones.  She states she will send us a copy of her labs to review her blood sugars because she would value your input on whether metformin is needed or not.  The importance of keeping all planned follow-up and taking all medications as prescribed was emphasized.  Has annual scheduled for February return to care at that time or as needed      No orders of the defined types were placed in this encounter.         This note was electronically signed.    Agatha Eddy, ADÁN  November 1, 2024

## 2024-11-01 ENCOUNTER — OFFICE VISIT (OUTPATIENT)
Dept: OBSTETRICS AND GYNECOLOGY | Facility: CLINIC | Age: 59
End: 2024-11-01
Payer: COMMERCIAL

## 2024-11-01 VITALS
WEIGHT: 112 LBS | SYSTOLIC BLOOD PRESSURE: 130 MMHG | HEIGHT: 63 IN | DIASTOLIC BLOOD PRESSURE: 80 MMHG | BODY MASS INDEX: 19.84 KG/M2

## 2024-11-01 DIAGNOSIS — R30.0 DYSURIA: ICD-10-CM

## 2024-11-01 DIAGNOSIS — N89.8 VAGINAL IRRITATION: Primary | ICD-10-CM

## 2024-11-01 LAB
BACTERIA UR QL AUTO: ABNORMAL /HPF
BILIRUB UR QL STRIP: NEGATIVE
CLARITY UR: CLEAR
COLOR UR: YELLOW
GLUCOSE UR STRIP-MCNC: NEGATIVE MG/DL
HGB UR QL STRIP.AUTO: NEGATIVE
HOLD SPECIMEN: NORMAL
HYALINE CASTS UR QL AUTO: ABNORMAL /LPF
KETONES UR QL STRIP: NEGATIVE
LEUKOCYTE ESTERASE UR QL STRIP.AUTO: ABNORMAL
NITRITE UR QL STRIP: NEGATIVE
PH UR STRIP.AUTO: 8 [PH] (ref 5–8)
PROT UR QL STRIP: NEGATIVE
RBC # UR STRIP: ABNORMAL /HPF
REF LAB TEST METHOD: ABNORMAL
SP GR UR STRIP: <=1.005 (ref 1–1.03)
SQUAMOUS #/AREA URNS HPF: ABNORMAL /HPF
UROBILINOGEN UR QL STRIP: ABNORMAL
WBC # UR STRIP: ABNORMAL /HPF

## 2024-11-01 PROCEDURE — 81001 URINALYSIS AUTO W/SCOPE: CPT | Performed by: NURSE PRACTITIONER

## 2024-11-01 PROCEDURE — 87086 URINE CULTURE/COLONY COUNT: CPT | Performed by: NURSE PRACTITIONER

## 2024-11-01 RX ORDER — VALACYCLOVIR HYDROCHLORIDE 1 G/1
1000 TABLET, FILM COATED ORAL 2 TIMES DAILY
Qty: 20 TABLET | Refills: 0 | Status: SHIPPED | OUTPATIENT
Start: 2024-11-01

## 2024-11-01 RX ORDER — FLUCONAZOLE 150 MG/1
150 TABLET ORAL DAILY
Qty: 2 TABLET | Refills: 0 | Status: SHIPPED | OUTPATIENT
Start: 2024-11-01

## 2024-11-01 RX ORDER — ACARBOSE 100 MG/1
100 TABLET ORAL
COMMUNITY
Start: 2024-10-02

## 2024-11-01 RX ORDER — ESTRADIOL 1 MG/1
1 TABLET ORAL DAILY
COMMUNITY
Start: 2024-10-30

## 2024-11-01 RX ORDER — PROGESTERONE 100 MG/1
100 CAPSULE ORAL
COMMUNITY
Start: 2024-10-02

## 2024-11-02 LAB — BACTERIA SPEC AEROBE CULT: NORMAL

## 2024-11-06 DIAGNOSIS — N76.6 GENITAL ULCER, FEMALE: Primary | ICD-10-CM

## 2024-11-07 ENCOUNTER — OFFICE VISIT (OUTPATIENT)
Age: 59
End: 2024-11-07
Payer: COMMERCIAL

## 2024-11-07 VITALS
HEIGHT: 63 IN | RESPIRATION RATE: 18 BRPM | OXYGEN SATURATION: 98 % | BODY MASS INDEX: 20.41 KG/M2 | WEIGHT: 115.2 LBS | DIASTOLIC BLOOD PRESSURE: 64 MMHG | SYSTOLIC BLOOD PRESSURE: 102 MMHG | HEART RATE: 80 BPM

## 2024-11-07 DIAGNOSIS — Z78.0 POST-MENOPAUSAL: ICD-10-CM

## 2024-11-07 DIAGNOSIS — Z23 ENCOUNTER FOR IMMUNIZATION: ICD-10-CM

## 2024-11-07 DIAGNOSIS — Z12.11 SCREENING FOR COLON CANCER: ICD-10-CM

## 2024-11-07 DIAGNOSIS — M85.859 OSTEOPENIA OF HIP, UNSPECIFIED LATERALITY: Primary | ICD-10-CM

## 2024-11-07 DIAGNOSIS — F90.9 ATTENTION DEFICIT HYPERACTIVITY DISORDER (ADHD), UNSPECIFIED ADHD TYPE: ICD-10-CM

## 2024-11-07 NOTE — LETTER
[facility]  Vaccine Consent Form    Patient Name:  Ainsley Elise  Patient :  1965     Vaccine(s) Ordered    Tdap Vaccine => 6yo IM (BOOSTRIX/ADACEL)        Screening Checklist  The following questions should be completed prior to vaccination. If you answer “yes” to any question, it does not necessarily mean you should not be vaccinated. It just means we may need to clarify or ask more questions. If a question is unclear, please ask your healthcare provider to explain it.    Yes No   Any fever or moderate to severe illness today (mild illness and/or antibiotic treatment are not contraindications)?     Do you have a history of a serious reaction to any previous vaccinations, such as anaphylaxis, encephalopathy within 7 days, Guillain-Marengo syndrome within 6 weeks, seizure?     Have you received any live vaccine(s) (e.g MMR, CARRILLO) or any other vaccines in the last month (to ensure duplicate doses aren't given)?     Do you have an anaphylactic allergy to latex (DTaP, DTaP-IPV, Hep A, Hep B, MenB, RV, Td, Tdap), baker’s yeast (Hep B, HPV), polysorbates (RSV, nirsevimab, PCV 20, Rotavirrus, Tdap, Shingrix), or gelatin (CARRILLO, MMR)?     Do you have an anaphylactic allergy to neomycin (Rabies, CARRILLO, MMR, IPV, Hep A), polymyxin B (IPV), or streptomycin (IPV)?      Any cancer, leukemia, AIDS, or other immune system disorder? (CARRILLO, MMR, RV)     Do you have a parent, brother, or sister with an immune system problem (if immune competence of vaccine recipient clinically verified, can proceed)? (MMR, CARRILLO)     Any recent steroid treatments for >2 weeks, chemotherapy, or radiation treatment? (CARRILLO, MMR)     Have you received antibody-containing blood transfusions or IVIG in the past 11 months (recommended interval is dependent on product)? (MMR, CARRILLO)     Have you taken antiviral drugs (acyclovir, famciclovir, valacyclovir for CARRILLO) in the last 24 or 48 hours, respectively?      Are you pregnant or planning to become  "pregnant within 1 month? (CARRILLO, MMR, HPV, IPV, MenB, Abrexvy; For Hep B- refer to Engerix-B; For RSV - Abrysvo is indicated for 32-36 weeks of pregnancy from September to January)     For infants, have you ever been told your child has had intussusception or a medical emergency involving obstruction of the intestine (Rotavirus)? If not for an infant, can skip this question.         *Ordering Physicians/APC should be consulted if \"yes\" is checked by the patient or guardian above.  I have received, read, and understand the Vaccine Information Statement (VIS) for each vaccine ordered.  I have considered my or my child's health status as well as the health status of my close contacts.  I have taken the opportunity to discuss my vaccine questions with my or my child's health care provider.   I have requested that the ordered vaccine(s) be given to me or my child.  I understand the benefits and risks of the vaccines.  I understand that I should remain in the clinic for 15 minutes after receiving the vaccine(s).  _________________________________________________________  Signature of Patient or Parent/Legal Guardian ____________________  Date     "

## 2024-11-07 NOTE — PROGRESS NOTES
Annual Health Maintenance Exam      HPI       History of Present Illness  The patient is a 59-year-old female here to establish care and have an annual exam.    She has been on hormone replacement therapy for menopause since 2024, which has improved her sleep. She experienced menopause in her early 50s and had previously been on hormone therapy for a few years.    She has a history of high cholesterol, which she managed with intermittent medication and a plant-based diet. Her cholesterol levels are currently high.     She has been taking methylphenidate for ADHD for 12 years.    She has a history of ear wax buildup and recently experienced an issue with her left earbud.    She maintains a healthy lifestyle, including running, Pilates, and a diet rich in fruits and vegetables. She takes B12 supplements, magnesium, psyllium husks, calcium, and vitamin D. She regularly visits her dentist and is planning to get vision insurance.    She has one sexual partner and does not wish to be screened for STDs. She was diagnosed with osteopenia in her hip six years ago and received an injection from a sports medicine doctor in [place] for back pain previously. She engages in weight-bearing exercises and plans to get a DEXA scan.    She would like the Tdap vaccine, declines influenza. She is due for a colonoscopy. She had a Pap smear in 2024.    She was prescribed metformin, which she took for a month before discontinuing due to stomach upset.    She reports no recent fever, chills, constipation, or diarrhea.     SOCIAL HISTORY  She has 3 adult kids and a granddaughter who is 2 years old. Her   by suicide 4-1/2 years ago. She is not working now. 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 She lives by herself with her dog. She has never smoked. She drinks 5 drinks a week approx. She denies drug use. She has taken THC gummies before. She wears seatbelt while driving. She does not text and drive.    FAMILY  HISTORY  She denies family history of breast cancer.    Intimate Partner Violence: no      Mood:  PHQ-2 Depression Screening  Little interest or pleasure in doing things? Not at all   Feeling down, depressed, or hopeless? Not at all   PHQ-2 Total Score 0       Past Medical History:  Patient Active Problem List   Diagnosis    Annual GYN exam in     Osteopenia of hip    Encounter for immunization    Post-menopausal    Attention deficit hyperactivity disorder (ADHD)        Allergies:  No Known Allergies     Medications:    Current Outpatient Medications:     estradiol (ESTRACE) 1 MG tablet, Take 1 tablet by mouth Daily., Disp: , Rfl:     fluconazole (Diflucan) 150 MG tablet, Take 1 tablet by mouth Daily., Disp: 2 tablet, Rfl: 0    methylphenidate CD (METADATE CD) 30 MG CR capsule, Take 1 capsule by mouth Daily, Disp: 30 capsule, Rfl: 0    multivitamin with minerals tablet tablet, Take 1 tablet by mouth Daily., Disp: , Rfl:     Progesterone (PROMETRIUM) 100 MG capsule, Take 1 capsule by mouth every night at bedtime., Disp: , Rfl:     valACYclovir (Valtrex) 1000 MG tablet, Take 1 tablet by mouth 2 (Two) Times a Day., Disp: 20 tablet, Rfl: 0    acarbose (PRECOSE) 100 MG tablet, Take 1 tablet by mouth 3 (Three) Times a Day With Meals., Disp: , Rfl:     metFORMIN (GLUCOPHAGE) 500 MG tablet, Take 1 tablet by mouth 2 (Two) Times a Day With Meals., Disp: , Rfl:      Immunizations:  Immunization History   Administered Date(s) Administered    Shingrix 2021, 05/10/2021        Surgical History:  Past Surgical History:   Procedure Laterality Date     SECTION      TONSILLECTOMY AND ADENOIDECTOMY  1972    WISDOM TOOTH EXTRACTION          Family History:  Family History   Problem Relation Age of Onset    Asthma Father     Asthma Brother     Epilepsy Brother     Thyroid cancer Brother     Ovarian cancer Neg Hx     Breast cancer Neg Hx        The following portions of the patient's chart were reviewed in this  encounter and updated as appropriate: Past Medical History, Surgical History, Family History, and Social History    Over the last 2 weeks, how often have you been bothered by any of the following problems?  Little interest or pleasure in doing things: Not at all  Feeling down, depressed, or hopeless: Not at all    Objective      Vitals:    11/07/24 0953   BP: 102/64   Pulse: 80   Resp: 18   SpO2: 98%        BMI is within normal parameters. No other follow-up for BMI required.       Physical Exam  Vitals reviewed.   Constitutional:       General: She is not in acute distress.     Appearance: Normal appearance. She is not ill-appearing.   HENT:      Left Ear: Tympanic membrane, ear canal and external ear normal. There is no impacted cerumen.   Neck:      Comments: No goiter   Cardiovascular:      Rate and Rhythm: Normal rate and regular rhythm.      Pulses: Normal pulses.      Heart sounds: Normal heart sounds. No murmur heard.  Pulmonary:      Effort: Pulmonary effort is normal. No respiratory distress.      Breath sounds: Normal breath sounds.   Abdominal:      General: There is no distension.      Palpations: Abdomen is soft. There is no mass.      Tenderness: There is no abdominal tenderness. There is no guarding.   Musculoskeletal:      Right lower leg: No edema.      Left lower leg: No edema.   Lymphadenopathy:      Cervical: No cervical adenopathy.   Skin:     General: Skin is warm and dry.   Neurological:      General: No focal deficit present.      Mental Status: She is alert and oriented to person, place, and time.   Psychiatric:         Mood and Affect: Mood normal.         Behavior: Behavior normal.     }     Diagnoses and all orders for this visit:    1. Osteopenia of hip, unspecified laterality (Primary)  -     DEXA Bone Density Axial; Future    2. Encounter for immunization  -     Tdap Vaccine => 6yo IM (BOOSTRIX/ADACEL)    3. Screening for colon cancer  -     Ambulatory Referral For Screening  Colonoscopy    4. Post-menopausal    5. Attention deficit hyperactivity disorder (ADHD), unspecified ADHD type        Assessment & Plan  1. Annual exam.  Her A1c level is 5.5, indicating good control of blood sugar levels. Her HDL cholesterol is notably high, suggesting a healthy lipid profile. There is no evidence of ear wax accumulation. She has been advised to maintain her current plant-based diet. Should she decide to have her hearing evaluated, she can communicate this via [employer] and an order will be placed accordingly.    2. Hormone replacement therapy.  She has been on hormone replacement therapy for 3 months, which has significantly improved her sleep. She will continue this therapy under the guidance of her gynecologist. The risks and benefits of hormone replacement therapy, especially for women over 60, have been discussed.    3. High cholesterol.  She has a history of high cholesterol but does not require a statin due to her high HDL levels and plant-based diet. She will continue her current dietary regimen.  ASCVD risk calculated, approx 3%     4. ADHD.  She has been on methylphenidate for 12 years, which has been effective. No changes to this medication are needed. Through psychiatry     5. Osteopenia.  She had a diagnosis of osteopenia in one of her hips 6 years ago on Xray. A DEXA scan has been ordered to evaluate her current bone density status.  On vit D/Ca, running, and weight bearing exercises     6. Health Maintenance.  A colonoscopy has been ordered. She will receive the Tdap vaccine today.   She would like q2y mammograms, next due in 12/2025.  Risks and benefits of 1 year versus 2-year screening discussed with patient.    Follow-up  Return in 1 year for her next annual exam.    Today was a preventative health visit: Patient was counseled on the following:  Lifestyle Changes: Diet, Exercise  Calcium and Vitamin D Supplementation and Weight Bearing Exercise for Bone Health  Reproductive Health,  contraception, safe sex, healthy relationships  Safety with driving    Cancer Screening:  Colonoscopy: ordered   Mammogram: 12/2023 normal, follow up in 12/2025   Pap: 2/2024, through gyn     BP at goal < 130/80    Mood: PHQ 2 Negative    Bone Health: Recommended DEXA and appropriate vitamin D and Calcium intake    Advance Care Planning   ACP discussion was held with the patient during this visit. Patient does not have an advance directive, information provided.            Patient or patient representative verbalized consent for the use of Ambient Listening during the visit with  Carline Pineda MD for chart documentation. 11/7/2024  10:38 EST

## 2024-11-13 ENCOUNTER — TELEPHONE (OUTPATIENT)
Age: 59
End: 2024-11-13
Payer: COMMERCIAL

## 2024-11-13 DIAGNOSIS — F90.0 ADHD (ATTENTION DEFICIT HYPERACTIVITY DISORDER), INATTENTIVE TYPE: ICD-10-CM

## 2024-11-13 NOTE — TELEPHONE ENCOUNTER
Diamond called and is requesting a refill on methylphenidate CD (METADATE CD) 30 MG CR capsule to be sent to [facility] pharmacy at [address]  Please advise.

## 2024-11-14 RX ORDER — METHYLPHENIDATE HYDROCHLORIDE 30 MG/1
30 CAPSULE, EXTENDED RELEASE ORAL DAILY
Qty: 30 CAPSULE | Refills: 0 | Status: SHIPPED | OUTPATIENT
Start: 2024-11-14

## 2024-11-14 NOTE — TELEPHONE ENCOUNTER
Called patient to advise Rx refill has been sent to pharmacy on file. No answer, left VM. Advised patient to call clinic [phone] if needed.

## 2024-12-03 ENCOUNTER — OFFICE VISIT (OUTPATIENT)
Age: 59
End: 2024-12-03
Payer: COMMERCIAL

## 2024-12-03 VITALS
HEIGHT: 63 IN | WEIGHT: 117.7 LBS | HEART RATE: 78 BPM | OXYGEN SATURATION: 98 % | BODY MASS INDEX: 20.86 KG/M2 | SYSTOLIC BLOOD PRESSURE: 108 MMHG | DIASTOLIC BLOOD PRESSURE: 78 MMHG

## 2024-12-03 DIAGNOSIS — F90.0 ADHD (ATTENTION DEFICIT HYPERACTIVITY DISORDER), INATTENTIVE TYPE: Primary | Chronic | ICD-10-CM

## 2024-12-03 PROCEDURE — 99214 OFFICE O/P EST MOD 30 MIN: CPT | Performed by: STUDENT IN AN ORGANIZED HEALTH CARE EDUCATION/TRAINING PROGRAM

## 2024-12-03 PROCEDURE — 96127 BRIEF EMOTIONAL/BEHAV ASSMT: CPT | Performed by: STUDENT IN AN ORGANIZED HEALTH CARE EDUCATION/TRAINING PROGRAM

## 2024-12-03 RX ORDER — METHYLPHENIDATE HYDROCHLORIDE 30 MG/1
30 CAPSULE, EXTENDED RELEASE ORAL DAILY
Qty: 30 CAPSULE | Refills: 0 | Status: SHIPPED | OUTPATIENT
Start: 2024-12-03

## 2024-12-03 NOTE — PROGRESS NOTES
[facility]             Follow Up Office Visit      Patient Name: Ainsley Elise  : 1965   MRN: [id number]     Referring Provider: Carline Pineda MD    Chief Complaint:      ICD-10-CM ICD-9-CM   1. ADHD (attention deficit hyperactivity disorder), inattentive type  F90.0 314.00      History of Present Illness:   Ainsley Elise is a 59 y.o. female   History of Present Illness    Patient is seen and evaluated in the office. She appears to be in no acute distress at this time. She is calm and cooperative with evaluation, and exhibits a linear and goal directed thought process.  Patient states that things have been going very well since last visit.  She is in a new relationship that she is happy with.  She was able to spend time with family for Thanksgiving.  She feels as though mood overall has been very stable and anxiety has been well managed.  She feels as though the medications are still working well, and she has not had any difficulties with this.  Sleep and appetite remain fair.  She denies any suicidal ideation, plan, intent.  We will follow-up in 4 months.    Previous Medication Trials:  None     Subjective      Review of Systems:   Review of Systems   Constitutional:  Negative for chills, diaphoresis, fatigue and fever.   HENT:  Negative for congestion, sore throat and swollen glands.    Respiratory:  Negative for cough.    Cardiovascular:  Negative for chest pain.   Gastrointestinal:  Negative for abdominal pain, nausea and vomiting.   Genitourinary:  Negative for dysuria.   Musculoskeletal:  Negative for myalgias and neck pain.   Skin:  Negative for rash.   Neurological:  Negative for weakness and numbness.     Screening Scores:   PHQ-9 : 0  TAMMY-7 : 0    Medications:     Current Outpatient Medications:     estradiol (ESTRACE) 1 MG tablet, Take 1 tablet by mouth Daily., Disp: , Rfl:     methylphenidate CD (METADATE CD) 30 MG CR capsule, Take 1  "capsule by mouth Daily, Disp: 30 capsule, Rfl: 0    multivitamin with minerals tablet tablet, Take 1 tablet by mouth Daily., Disp: , Rfl:     Progesterone (PROMETRIUM) 100 MG capsule, Take 1 capsule by mouth every night at bedtime., Disp: , Rfl:     Medication Considerations:  PRITESH reviewed and appropriate.     Allergies:   No Known Allergies    Objective     Vital Signs:   Vitals:    24 1431   BP: 108/78   Pulse: 78   SpO2: 98%   Weight: 53.4 kg (117 lb 11.2 oz)   Height: 160 cm (62.99\")     Body mass index is 20.85 kg/m².     Mental Status Exam:   MENTAL STATUS EXAM   General Appearance:  Cleanly groomed and dressed  Eye Contact:  Good eye contact  Attitude:  Cooperative  Motor Activity:  Normal gait, posture  Muscle Strength:  Normal  Speech:  Normal rate, tone, volume  Language:  Spontaneous  Mood and affect:  Normal, pleasant and appropriate  Hopelessness:  Denies  Thought Process:  Logical and goal-directed  Associations/ Thought Content:  No delusions  Hallucinations:  None  Suicidal Ideations:  Not present  Homicidal Ideation:  Not present  Sensorium:  Alert  Orientation:  Person, place, time and situation  Immediate Recall, Recent, and Remote Memory:  Intact  Attention Span/ Concentration:  Good  Fund of Knowledge:  Appropriate for age and educational level  Intellectual Functioning:  Average range  Insight:  Fair  Judgement:  Fair  Reliability:  Fair  Impulse Control:  Fair      SUICIDE RISK ASSESSMENT/CSSRS:  1. Does patient have thoughts of suicide? denies  2. Does patient have intent for suicide? denies  3. Does patient have a current plan for suicide? denies  4. History of suicide attempts: denies  5. Family history of suicide or attempts:   of suicide 4 years ago  6. History of violent behaviors towards others or property or thoughts of committing suicide: denies  7. History of sexual aggression toward others: denies  8. Access to firearms or weapons: denies    Assessment / Plan  "     Visit Diagnosis/Orders Placed This Visit:  Diagnoses and all orders for this visit:    Assessment & Plan      1. ADHD (attention deficit hyperactivity disorder), inattentive type (Primary)  - Continue Metadate 30 mg po daily  - CSA signed  - Follow up with writer in 4 mo  Chart Reviewed      Functional Status: Mild impairment      Prognosis: Fair with Ongoing Treatment     Impression/Formulation:  Patient appeared alert and oriented. Patient is receptive to assistance with maintaining a stable lifestyle.  Patient presents with history of     ICD-10-CM ICD-9-CM   1. ADHD (attention deficit hyperactivity disorder), inattentive type  F90.0 314.00     Treatment Plan:     Patient will continue supportive psychotherapy efforts and medications as indicated. Clinic will obtain release of information for current treatment team for continuity of care as needed. Patient will contact this office, call 911 or present to the nearest emergency room should suicidal or homicidal ideations occur.  Discussed medication options and treatment plan of prescribed medication(s) as well as the risks, benefits, and potential side effects. Patient ackowledged and verbally consented to continue with current treatment plan and was educated on the importance of compliance with treatment and follow-up appointments.     Quality Measures:  Tobacco: Ainsley Elise  reports that she has never smoked. She has never used smokeless tobacco. I have educated her on the risk of diseases from using tobacco products such as cancer, COPD, and heart disease.     Follow Up:   Return in about 4 months (around 4/3/2025) for Medication Management.    Short-term goals: Patient will adhere to medication regimen and experience continued improvement in symptoms over the next 3 months.   Long-term goals: Patient will adhere to medication treatment plan and report improvement in symptoms over the next 6 months    Indiana Beaver MD  [facility]     This is electronically signed by Indiana Beaver MD     Patient or patient representative verbalized consent for the use of Ambient Listening during the visit with  Indiana Beaver MD for chart documentation. 12/3/2024  14:22 EST

## 2025-01-07 ENCOUNTER — HOSPITAL ENCOUNTER (OUTPATIENT)
Dept: BONE DENSITY | Facility: HOSPITAL | Age: 60
Discharge: HOME | End: 2025-01-07
Admitting: STUDENT IN AN ORGANIZED HEALTH CARE EDUCATION/TRAINING PROGRAM
Payer: COMMERCIAL

## 2025-01-07 DIAGNOSIS — M85.859 OSTEOPENIA OF HIP, UNSPECIFIED LATERALITY: ICD-10-CM

## 2025-01-07 PROCEDURE — 77080 DXA BONE DENSITY AXIAL: CPT

## 2025-01-14 DIAGNOSIS — F90.0 ADHD (ATTENTION DEFICIT HYPERACTIVITY DISORDER), INATTENTIVE TYPE: Chronic | ICD-10-CM

## 2025-01-14 RX ORDER — ESTRADIOL 1 MG/1
1 TABLET ORAL DAILY
Qty: 90 TABLET | Refills: 3 | Status: SHIPPED | OUTPATIENT
Start: 2025-01-14

## 2025-01-14 RX ORDER — METHYLPHENIDATE HYDROCHLORIDE 30 MG/1
30 CAPSULE, EXTENDED RELEASE ORAL DAILY
Qty: 30 CAPSULE | Refills: 0 | Status: SHIPPED | OUTPATIENT
Start: 2025-01-14

## 2025-01-14 RX ORDER — PROGESTERONE 100 MG/1
100 CAPSULE ORAL
Qty: 90 CAPSULE | Refills: 3 | Status: SHIPPED | OUTPATIENT
Start: 2025-01-14

## 2025-01-14 NOTE — TELEPHONE ENCOUNTER
Rx Refill Note  Requested Prescriptions     Pending Prescriptions Disp Refills    methylphenidate CD (METADATE CD) 30 MG CR capsule 30 capsule 0     Sig: Take 1 capsule by mouth Daily      Last office visit with prescribing clinician: 12/3/2024     Next office visit with prescribing clinician: 3/18/2025       Angela Donovan MA  01/14/25, 09:55 EST

## 2025-01-20 RX ORDER — SODIUM, POTASSIUM,MAG SULFATES 17.5-3.13G
1 SOLUTION, RECONSTITUTED, ORAL ORAL TAKE AS DIRECTED
Qty: 354 ML | Refills: 0 | Status: SHIPPED | OUTPATIENT
Start: 2025-01-20

## 2025-02-03 ENCOUNTER — OUTSIDE FACILITY SERVICE (OUTPATIENT)
Dept: GASTROENTEROLOGY | Facility: CLINIC | Age: 60
End: 2025-02-03
Payer: COMMERCIAL

## 2025-02-03 PROCEDURE — 45378 DIAGNOSTIC COLONOSCOPY: CPT | Performed by: INTERNAL MEDICINE

## 2025-02-17 ENCOUNTER — TELEPHONE (OUTPATIENT)
Age: 60
End: 2025-02-17
Payer: COMMERCIAL

## 2025-02-17 DIAGNOSIS — F90.0 ADHD (ATTENTION DEFICIT HYPERACTIVITY DISORDER), INATTENTIVE TYPE: Chronic | ICD-10-CM

## 2025-02-17 RX ORDER — METHYLPHENIDATE HYDROCHLORIDE 30 MG/1
30 CAPSULE, EXTENDED RELEASE ORAL DAILY
Qty: 30 CAPSULE | Refills: 0 | Status: SHIPPED | OUTPATIENT
Start: 2025-02-17

## 2025-03-13 ENCOUNTER — TELEPHONE (OUTPATIENT)
Age: 60
End: 2025-03-13
Payer: COMMERCIAL

## 2025-03-13 DIAGNOSIS — F90.0 ADHD (ATTENTION DEFICIT HYPERACTIVITY DISORDER), INATTENTIVE TYPE: Chronic | ICD-10-CM

## 2025-03-13 RX ORDER — METHYLPHENIDATE HYDROCHLORIDE 30 MG/1
30 CAPSULE, EXTENDED RELEASE ORAL DAILY
Qty: 30 CAPSULE | Refills: 0 | Status: SHIPPED | OUTPATIENT
Start: 2025-03-13

## 2025-03-13 NOTE — TELEPHONE ENCOUNTER
A prescription for methylphenidate CD (METADATE CD) 30 MG CR capsule was called into [facility] on [address] in Feb but they were out of it. She has tried to fill the prescriptions at other pharmacies and they're out of it also.  They all ask if this medication is the same as Ritalin, if it is they don't have that either. Is there another medication that she can take?

## 2025-03-13 NOTE — TELEPHONE ENCOUNTER
Called patient to advise to call [facility]. Also recommended [facility], as they are able to order medications that they do not have in stock at the time. Patient verbalized understanding and was appreciative.

## 2025-03-13 NOTE — TELEPHONE ENCOUNTER
PT CALLED REQUESTING A REFILL FOR methylphenidate CD (METADATE CD) 30 MG CR capsule BE SENT TO [facility]

## 2025-03-18 ENCOUNTER — OFFICE VISIT (OUTPATIENT)
Age: 60
End: 2025-03-18
Payer: COMMERCIAL

## 2025-03-18 VITALS
SYSTOLIC BLOOD PRESSURE: 112 MMHG | DIASTOLIC BLOOD PRESSURE: 60 MMHG | HEIGHT: 63 IN | BODY MASS INDEX: 19.84 KG/M2 | OXYGEN SATURATION: 98 % | WEIGHT: 112 LBS | HEART RATE: 80 BPM

## 2025-03-18 DIAGNOSIS — F90.0 ADHD (ATTENTION DEFICIT HYPERACTIVITY DISORDER), INATTENTIVE TYPE: Primary | ICD-10-CM

## 2025-03-18 RX ORDER — METHYLPHENIDATE HYDROCHLORIDE 30 MG/1
30 CAPSULE, EXTENDED RELEASE ORAL DAILY
Qty: 90 CAPSULE | Refills: 0 | Status: SHIPPED | OUTPATIENT
Start: 2025-03-18

## 2025-03-18 NOTE — PROGRESS NOTES
[facility]             Follow Up Office Visit      Patient Name: Ainsley Elise  : 1965   MRN: [id number]     Referring Provider: Carline Pineda MD    Chief Complaint:      ICD-10-CM ICD-9-CM   1. ADHD (attention deficit hyperactivity disorder), inattentive type  F90.0 314.00     History of Present Illness:   Ainsley Elise is a 59 y.o. female   History of Present Illness    Patient is seen and evaluated in the office. She appears to be in no acute distress at this time. She is calm and cooperative with evaluation, and exhibits a linear and goal directed thought process.  She reports a stable mood with no significant anxiety. She speaks with writer regarding ongoing success in her current relationship. Her sleep pattern is generally satisfactory, although she occasionally resorts to using sleep gummies after experiencing a few nights of poor sleep quality. She reports that these gummies appear to be effective as she reports feeling refreshed upon awakening. She does not feel the need for any additional sleep aids. She was without her medication for a month because [facility] did not have it, but she picked it up last week. She still feels as though the medication is significantly beneficial for her. She denies any Si, intent, or plan. We will follow up in 4 mo.     Previous Medication Trials:  None     Subjective      Review of Systems:   Review of Systems   Constitutional:  Negative for chills, diaphoresis, fatigue and fever.   HENT:  Negative for congestion, sore throat and swollen glands.    Respiratory:  Negative for cough.    Cardiovascular:  Negative for chest pain.   Gastrointestinal:  Negative for abdominal pain, nausea and vomiting.   Genitourinary:  Negative for dysuria.   Musculoskeletal:  Negative for myalgias and neck pain.   Skin:  Negative for rash.   Neurological:  Negative for weakness and numbness.     Screening Scores:   PHQ-9 : 0  TAMMY-7 :  "0    Medications:     Current Outpatient Medications:     estradiol (ESTRACE) 1 MG tablet, Take 1 tablet by mouth Daily., Disp: 90 tablet, Rfl: 3    methylphenidate CD (METADATE CD) 30 MG CR capsule, Take 1 capsule by mouth Daily, Disp: 90 capsule, Rfl: 0    multivitamin with minerals tablet tablet, Take 1 tablet by mouth Daily., Disp: , Rfl:     Progesterone (PROMETRIUM) 100 MG capsule, Take 1 capsule by mouth every night at bedtime., Disp: 90 capsule, Rfl: 3    Medication Considerations:  PRITESH reviewed and appropriate.     Allergies:   No Known Allergies    Objective     Vital Signs:   Vitals:    25 1504   BP: 112/60   Pulse: 80   SpO2: 98%   Weight: 50.8 kg (112 lb)   Height: 160 cm (62.99\")     Body mass index is 19.84 kg/m².     Mental Status Exam:   MENTAL STATUS EXAM   General Appearance:  Cleanly groomed and dressed  Eye Contact:  Good eye contact  Attitude:  Cooperative  Motor Activity:  Normal gait, posture  Muscle Strength:  Normal  Speech:  Normal rate, tone, volume  Language:  Spontaneous  Mood and affect:  Normal, pleasant and appropriate  Hopelessness:  Denies  Thought Process:  Logical and goal-directed  Associations/ Thought Content:  No delusions  Hallucinations:  None  Suicidal Ideations:  Not present  Homicidal Ideation:  Not present  Sensorium:  Alert  Orientation:  Person, place, time and situation  Immediate Recall, Recent, and Remote Memory:  Intact  Attention Span/ Concentration:  Good  Fund of Knowledge:  Appropriate for age and educational level  Intellectual Functioning:  Average range  Insight:  Fair  Judgement:  Fair  Reliability:  Fair  Impulse Control:  Fair      SUICIDE RISK ASSESSMENT/CSSRS:  1. Does patient have thoughts of suicide? denies  2. Does patient have intent for suicide? denies  3. Does patient have a current plan for suicide? denies  4. History of suicide attempts: denies  5. Family history of suicide or attempts:   of suicide 4 years ago  6. History " of violent behaviors towards others or property or thoughts of committing suicide: denies  7. History of sexual aggression toward others: denies  8. Access to firearms or weapons: denies    Assessment / Plan      Visit Diagnosis/Orders Placed This Visit:  Diagnoses and all orders for this visit:    Assessment & Plan  1. Anxiety.  Her condition appears to be stable at present.    2. Sleep issues.  She reports occasional use of sleeping gummies after a few nights of not sleeping well. No additional sleep medication is deemed necessary at this time.    Follow-up  The patient will follow up in July.    1. ADHD (attention deficit hyperactivity disorder), inattentive type (Primary)  - Continue Metadate 30 mg po daily  - CSA signed 12/3/24  - Follow up with writer in 4 mo  Chart Reviewed      Functional Status: Mild impairment      Prognosis: Fair with Ongoing Treatment     Impression/Formulation:  Patient appeared alert and oriented. Patient is receptive to assistance with maintaining a stable lifestyle.  Patient presents with history of     ICD-10-CM ICD-9-CM   1. ADHD (attention deficit hyperactivity disorder), inattentive type  F90.0 314.00     Treatment Plan:     Patient will continue supportive psychotherapy efforts and medications as indicated. Clinic will obtain release of information for current treatment team for continuity of care as needed. Patient will contact this office, call 911 or present to the nearest emergency room should suicidal or homicidal ideations occur.  Discussed medication options and treatment plan of prescribed medication(s) as well as the risks, benefits, and potential side effects. Patient ackowledged and verbally consented to continue with current treatment plan and was educated on the importance of compliance with treatment and follow-up appointments.     Quality Measures:  Tobacco: Ainsley SEGURA Arik  reports that she has never smoked. She has never used smokeless tobacco. I have educated  her on the risk of diseases from using tobacco products such as cancer, COPD, and heart disease.     Follow Up:   Return in about 4 months (around 7/18/2025) for Medication Management.    Short-term goals: Patient will adhere to medication regimen and experience continued improvement in symptoms over the next 3 months.   Long-term goals: Patient will adhere to medication treatment plan and report improvement in symptoms over the next 6 months    Indiana Beaver MD  [facility]     This is electronically signed by Indiana Beaver MD     Patient or patient representative verbalized consent for the use of Ambient Listening during the visit with  Indiana Beaver MD for chart documentation. 3/18/2025  14:22 EST

## 2025-04-14 DIAGNOSIS — F90.0 ADHD (ATTENTION DEFICIT HYPERACTIVITY DISORDER), INATTENTIVE TYPE: ICD-10-CM

## 2025-04-14 RX ORDER — METHYLPHENIDATE HYDROCHLORIDE 30 MG/1
30 CAPSULE, EXTENDED RELEASE ORAL DAILY
Qty: 90 CAPSULE | Refills: 0 | Status: CANCELLED | OUTPATIENT
Start: 2025-04-14

## 2025-04-14 NOTE — TELEPHONE ENCOUNTER
Called PT pharmacy to inquire as to if the PT has an Rx order on file to be filled for her methylphenidate CD (METADATE CD) 30 MG CR capsule. Pharmacy stated they do have the order but are currently out of stock and are unsure when they would be able to receive more stock.

## 2025-04-14 NOTE — TELEPHONE ENCOUNTER
Called PT to inform her per [facility] pharmacy they are out of stock of her Rx currently. Informed PT she can call around to local pharmacies to check stock and then call back and let us know where to send this Rx. PT stated this happened last time and she was told to call [facility] pharmacy as other pharmacies she called were out of stock as well. Informed PT per her pharmacy they are going to continually check on the status of being able to re order the rx. PT V/U and had no further questions at this time.

## 2025-04-16 ENCOUNTER — TELEPHONE (OUTPATIENT)
Age: 60
End: 2025-04-16
Payer: COMMERCIAL

## 2025-04-16 NOTE — TELEPHONE ENCOUNTER
NILESH FROM [facility] CALLED.  SHE STATES THEY ARE ONLY ABLE TO FILL 40 TABS OF  methylphenidate CD (METADATE CD) 30 MG CR capsule OUT OF THE 90 PRESCRIBED DUE TO NOT HAVING ENOUGH IN STOCK. SHE JUST WANTED TO LET US KNOW

## 2025-05-23 ENCOUNTER — TELEPHONE (OUTPATIENT)
Age: 60
End: 2025-05-23
Payer: COMMERCIAL

## 2025-05-23 DIAGNOSIS — F90.0 ADHD (ATTENTION DEFICIT HYPERACTIVITY DISORDER), INATTENTIVE TYPE: ICD-10-CM

## 2025-05-23 DIAGNOSIS — Z51.81 ENCOUNTER FOR THERAPEUTIC DRUG MONITORING: Primary | ICD-10-CM

## 2025-05-23 RX ORDER — METHYLPHENIDATE HYDROCHLORIDE 30 MG/1
30 CAPSULE, EXTENDED RELEASE ORAL EVERY MORNING
Qty: 28 CAPSULE | Refills: 0 | Status: SHIPPED | OUTPATIENT
Start: 2025-05-23 | End: 2025-06-20

## 2025-05-23 NOTE — TELEPHONE ENCOUNTER
Pt is inquiring about their methylphenidate. She stated that she received her medication a few months ago, but was only given 40 pills instead of the appropriate amount because she was told there was a shortage on that medication. She stated she went back to the pharmacy today and the medication is still on backorder. She was wanting to know what she should do. Wanted to know if Dr. Beaver would want her to switch to a new medication.

## 2025-05-23 NOTE — TELEPHONE ENCOUNTER
Called patient to advise that updated rx for [facility]italin [facility]. Patient verbalized understanding and was appreciative.

## 2025-05-23 NOTE — TELEPHONE ENCOUNTER
Patient is requesting a refill on the following medication: Methylphenidate 30 mg CR capsule    [facility]acy: [facility]    Vitals at last visit:   Blood Pressure - 112/60  HR - 80  Oxygen - 98%  [facility] Location -  [facility]  Date - 03/18/2025    Current CSA on file: Yes    UDS within the last year: No (advised patient that we will need an up to date one)    UDS abnormal: No

## 2025-05-27 DIAGNOSIS — F90.0 ADHD (ATTENTION DEFICIT HYPERACTIVITY DISORDER), INATTENTIVE TYPE: ICD-10-CM

## 2025-05-27 NOTE — TELEPHONE ENCOUNTER
Patient called back to report that she had not picked up the [facility]ital[facility] from [facility] as [facility] was out of stock.

## 2025-05-27 NOTE — TELEPHONE ENCOUNTER
LVM that Ritalin and Metadate are both brand names of methylphenidate. Ritalin appears to have been picked up 5/26. Advised in VM to call back if patient has nay further questions or concerns.

## 2025-05-27 NOTE — TELEPHONE ENCOUNTER
Pt called stating that she went to the [facility] pharmacy to pickup the Ritalin that was put in for her but the pharmacy said they did not have a Ritalin prescription and only had an order for the methylphenidate which is still on backorder.

## 2025-05-28 RX ORDER — METHYLPHENIDATE HYDROCHLORIDE 30 MG/1
30 CAPSULE, EXTENDED RELEASE ORAL EVERY MORNING
Qty: 30 CAPSULE | Refills: 0 | Status: SHIPPED | OUTPATIENT
Start: 2025-05-28 | End: 2025-06-27

## 2025-06-25 DIAGNOSIS — F90.0 ADHD (ATTENTION DEFICIT HYPERACTIVITY DISORDER), INATTENTIVE TYPE: ICD-10-CM

## 2025-06-25 RX ORDER — METHYLPHENIDATE HYDROCHLORIDE 30 MG/1
30 CAPSULE, EXTENDED RELEASE ORAL EVERY MORNING
Qty: 30 CAPSULE | Refills: 0 | Status: SHIPPED | OUTPATIENT
Start: 2025-06-25 | End: 2025-07-25

## 2025-06-25 NOTE — TELEPHONE ENCOUNTER
Rx Refill Note  Requested Prescriptions     Pending Prescriptions Disp Refills    methylphenidate LA (RITALIN LA) 30 MG 24 hr capsule 30 capsule 0     Sig: Take 1 capsule by mouth Every Morning for 30 days      Last office visit with prescribing clinician: 3/18/2025   Last telemedicine visit with prescribing clinician: Visit date not found   Next office visit with prescribing clinician: 7/17/2025     Carissa Dougherty MA  06/25/25, 07:54 EDT

## 2025-07-17 ENCOUNTER — OFFICE VISIT (OUTPATIENT)
Age: 60
End: 2025-07-17
Payer: COMMERCIAL

## 2025-07-17 VITALS
SYSTOLIC BLOOD PRESSURE: 112 MMHG | HEART RATE: 78 BPM | BODY MASS INDEX: 18.85 KG/M2 | DIASTOLIC BLOOD PRESSURE: 68 MMHG | OXYGEN SATURATION: 98 % | WEIGHT: 106.4 LBS | HEIGHT: 63 IN

## 2025-07-17 DIAGNOSIS — Z51.81 ENCOUNTER FOR THERAPEUTIC DRUG MONITORING: ICD-10-CM

## 2025-07-17 DIAGNOSIS — F90.0 ADHD (ATTENTION DEFICIT HYPERACTIVITY DISORDER), INATTENTIVE TYPE: Primary | Chronic | ICD-10-CM

## 2025-07-17 RX ORDER — METHYLPHENIDATE HYDROCHLORIDE 30 MG/1
30 CAPSULE, EXTENDED RELEASE ORAL EVERY MORNING
Qty: 90 CAPSULE | Refills: 0 | Status: SHIPPED | OUTPATIENT
Start: 2025-07-17

## 2025-07-17 NOTE — PROGRESS NOTES
[facility]             Follow Up Office Visit      Patient Name: Ainsley Elise  : 1965   MRN: [id number]     Referring Provider: Carline Pineda MD    Chief Complaint:      ICD-10-CM ICD-9-CM   1. ADHD (attention deficit hyperactivity disorder), inattentive type  F90.0 314.00   2. Encounter for therapeutic drug monitoring  Z51.81 V58.83     History of Present Illness:   Ainsley Elise is a 60 y.o. female   History of Present Illness    Patient is seen and evaluated in the office. She appears to be in no acute distress at this time. She is calm and cooperative with evaluation, and exhibits a linear and goal directed thought process.  She reports that her mood and anxiety levels have remained stable. Recently, she got engaged and bought a [employer]. She is planning to get  in  next year. She has three adult children, and her fiancé has two children aged 19 and 21. She notes that their Mu-ism views and energy levels are different, but they are both respectful and open. She has been experiencing some more difficulties getting to sleep at night some nights, which she attributes to her age. Overall, she feels as though she is doing well. She denies any symptoms suggestive of depression or anxiety out of proportion to situation. She denies any SI, intent, or plan. We will follow up in 3 mo.     Previous Medication Trials:  None     Subjective      Review of Systems:   Review of Systems   Constitutional:  Negative for chills, diaphoresis, fatigue and fever.   HENT:  Negative for congestion, sore throat and swollen glands.    Respiratory:  Negative for cough.    Cardiovascular:  Negative for chest pain.   Gastrointestinal:  Negative for abdominal pain, nausea and vomiting.   Genitourinary:  Negative for dysuria.   Musculoskeletal:  Negative for myalgias and neck pain.   Skin:  Negative for rash.   Neurological:  Negative for weakness and numbness.  "    Screening Scores:   PHQ-9 : 1  TAMMY-7 : 2    Medications:     Current Outpatient Medications:     estradiol (ESTRACE) 1 MG tablet, Take 1 tablet by mouth Daily., Disp: 90 tablet, Rfl: 3    methylphenidate LA (RITALIN LA) 30 MG 24 hr capsule, Take 1 capsule by mouth Every Morning, Disp: 90 capsule, Rfl: 0    multivitamin with minerals tablet tablet, Take 1 tablet by mouth Daily., Disp: , Rfl:     Progesterone (PROMETRIUM) 100 MG capsule, Take 1 capsule by mouth every night at bedtime., Disp: 90 capsule, Rfl: 3    Medication Considerations:  PRITESH reviewed and appropriate.     Allergies:   No Known Allergies    Objective     Vital Signs:   Vitals:    07/17/25 1156   BP: 112/68   Pulse: 78   SpO2: 98%   Weight: 48.3 kg (106 lb 6.4 oz)   Height: 160 cm (62.99\")       Body mass index is 18.85 kg/m².     Mental Status Exam:   MENTAL STATUS EXAM   General Appearance:  Cleanly groomed and dressed  Eye Contact:  Good eye contact  Attitude:  Cooperative  Motor Activity:  Normal gait, posture  Muscle Strength:  Normal  Speech:  Normal rate, tone, volume  Language:  Spontaneous  Mood and affect:  Normal, pleasant and appropriate  Hopelessness:  Denies  Thought Process:  Logical and goal-directed  Associations/ Thought Content:  No delusions  Hallucinations:  None  Suicidal Ideations:  Not present  Homicidal Ideation:  Not present  Sensorium:  Alert  Orientation:  Person, place, time and situation  Immediate Recall, Recent, and Remote Memory:  Intact  Attention Span/ Concentration:  Good  Fund of Knowledge:  Appropriate for age and educational level  Intellectual Functioning:  Average range  Insight:  Fair  Judgement:  Fair  Reliability:  Fair  Impulse Control:  Fair      SUICIDE RISK ASSESSMENT/CSSRS:  1. Does patient have thoughts of suicide? denies  2. Does patient have intent for suicide? denies  3. Does patient have a current plan for suicide? denies  4. History of suicide attempts: denies  5. Family history of suicide " or attempts:   of suicide 4 years ago  6. History of violent behaviors towards others or property or thoughts of committing suicide: denies  7. History of sexual aggression toward others: denies  8. Access to firearms or weapons: denies    Assessment / Plan      Visit Diagnosis/Orders Placed This Visit:  Diagnoses and all orders for this visit:  Assessment & Plan  Problems:  - Anxiety    Content of Therapy:  During the session, the patient discussed her engagement, purchase of a townhouse, and plans for a small wedding next . She shared her experiences of being in a relationship as an older adult, highlighting differences in Muslim views and energy levels, but also emphasizing mutual respect and openness. She reported that her mood and anxiety levels have remained stable.    Clinical Impression:  The patient's mental health appears stable, with no significant changes in mood or anxiety. She reported occasional difficulty with sleep but did not indicate any severe impact on her daily functioning.     Therapeutic Intervention:  The session included discussions on reframing thoughts about relationships and life changes, exploring feelings related to engagement and future plans, and addressing potential conflicts arising from differences in Muslim views and energy levels.    Plan:  - Continue current medication regimen with a 90-day supply.  - Schedule next appointment in 90 days    Follow-up:  Next appointment scheduled in 90 days to monitor medication and mental health status.    1. ADHD (attention deficit hyperactivity disorder), inattentive type (Primary)  - Continue Metadate 30 mg po daily  - CSA signed 25  - Updated UDS obtained today  - Follow up with writer in 3 mo  UDS Reviewed: UDS from 23 reviewed  Chart Reviewed      Functional Status: Mild impairment      Prognosis: Fair with Ongoing Treatment     Impression/Formulation:  Patient appeared alert and oriented. Patient is  receptive to assistance with maintaining a stable lifestyle.  Patient presents with history of     ICD-10-CM ICD-9-CM   1. ADHD (attention deficit hyperactivity disorder), inattentive type  F90.0 314.00   2. Encounter for therapeutic drug monitoring  Z51.81 V58.83     Treatment Plan:     Patient will continue supportive psychotherapy efforts and medications as indicated. Clinic will obtain release of information for current treatment team for continuity of care as needed. Patient will contact this office, call 911 or present to the nearest emergency room should suicidal or homicidal ideations occur.  Discussed medication options and treatment plan of prescribed medication(s) as well as the risks, benefits, and potential side effects. Patient ackowledged and verbally consented to continue with current treatment plan and was educated on the importance of compliance with treatment and follow-up appointments.     Quality Measures:  Tobacco: Ainsley Elise  reports that she has never smoked. She has never used smokeless tobacco. I have educated her on the risk of diseases from using tobacco products such as cancer, COPD, and heart disease.     Patient screened positive for depression based on a PHQ-9 score of 1 on 7/17/2025. Follow-up recommendations include: follow up with writer in 3 mo, continue medications as prescribed.     Follow Up:   Return in about 3 months (around 10/17/2025) for Medication Management.    Short-term goals: Patient will adhere to medication regimen and experience continued improvement in symptoms over the next 3 months.   Long-term goals: Patient will adhere to medication treatment plan and report improvement in symptoms over the next 6 months    Indiana Beaver MD  [facility] [address]     This is electronically signed by Indiana Beaver MD     Patient or patient representative verbalized consent for the use of Ambient Listening during the visit with  Indiana Shane  MD Sd for chart documentation. 7/17/2025  14:22 EST

## 2025-07-23 LAB
1OH-MIDAZOLAM UR QL SCN: NOT DETECTED NG/MG CREAT
6MAM UR QL SCN: NEGATIVE NG/ML
7AMINOCLONAZEPAM/CREAT UR: NOT DETECTED NG/MG CREAT
A-OH ALPRAZ/CREAT UR: NOT DETECTED NG/MG CREAT
A-OH-TRIAZOLAM/CREAT UR CFM: NOT DETECTED NG/MG CREAT
ACP UR QL CFM: NOT DETECTED
ALPRAZ/CREAT UR CFM: NOT DETECTED NG/MG CREAT
AMPHETAMINES UR QL SCN: NEGATIVE NG/ML
APAP UR QL SCN: NEGATIVE UG/ML
BARBITURATES UR QL SCN: NEGATIVE NG/ML
BENZODIAZ SCN METH UR: NEGATIVE
BUPRENORPHINE UR QL SCN: NEGATIVE
BUPRENORPHINE/CREAT UR: NOT DETECTED NG/MG CREAT
CANNABINOIDS UR QL SCN: NEGATIVE NG/ML
CARISOPRODOL UR QL: NEGATIVE NG/ML
CLONAZEPAM/CREAT UR CFM: NOT DETECTED NG/MG CREAT
COCAINE+BZE UR QL SCN: NEGATIVE NG/ML
CREAT UR-MCNC: 29 MG/DL
D-METHORPHAN UR-MCNC: NOT DETECTED NG/ML
D-METHORPHAN+LEVORPHANOL UR QL: NOT DETECTED
DESALKYLFLURAZ/CREAT UR: NOT DETECTED NG/MG CREAT
DIAZEPAM/CREAT UR: NOT DETECTED NG/MG CREAT
ETG ETS UR QL CFM: NORMAL
ETHANOL UR QL SCN: NEGATIVE G/DL
ETHANOL UR QL SCN: NORMAL NG/ML
ETHYL GLUCURONIDE UR CFM-MCNC: 5562 NG/MG CREAT
ETHYL SULFATE UR CFM-MCNC: 1800 NG/MG CREAT
FENTANYL CTO UR SCN-MCNC: NEGATIVE NG/ML
FENTANYL/CREAT UR: NOT DETECTED NG/MG CREAT
FLUNITRAZEPAM UR QL SCN: NOT DETECTED NG/MG CREAT
GABAPENTIN UR-MCNC: NEGATIVE UG/ML
HALLUCINOGENS UR: NEGATIVE
HYPNOTICS UR QL SCN: NEGATIVE
KETAMINE UR QL: NOT DETECTED
LORAZEPAM/CREAT UR: NOT DETECTED NG/MG CREAT
MEPERIDINE UR QL SCN: NEGATIVE NG/ML
METHADONE UR QL SCN: NEGATIVE NG/ML
METHADONE+METAB UR QL SCN: NEGATIVE NG/ML
MIDAZOLAM/CREAT UR CFM: NOT DETECTED NG/MG CREAT
MISCELLANEOUS, UR: NEGATIVE
NORBUPRENORPHINE/CREAT UR: NOT DETECTED NG/MG CREAT
NORDIAZEPAM/CREAT UR: NOT DETECTED NG/MG CREAT
NORFENTANYL/CREAT UR: NOT DETECTED NG/MG CREAT
NORFLUNITRAZEPAM UR-MCNC: NOT DETECTED NG/MG CREAT
NORKETAMINE UR-MCNC: NOT DETECTED UG/ML
OPIATES UR SCN-MCNC: NEGATIVE NG/ML
OXAZEPAM/CREAT UR: NOT DETECTED NG/MG CREAT
OXYCODONE CTO UR SCN-MCNC: NEGATIVE NG/ML
PCP UR QL SCN: NEGATIVE NG/ML
PRESCRIBED MEDICATIONS: NORMAL
PROPOXYPH UR QL SCN: NEGATIVE NG/ML
TAPENTADOL CTO UR SCN-MCNC: NEGATIVE NG/ML
TEMAZEPAM/CREAT UR: NOT DETECTED NG/MG CREAT
TRAMADOL UR QL SCN: NEGATIVE NG/ML
ZALEPLON UR-MCNC: NOT DETECTED NG/ML
ZOLPIDEM PHENYL-4-CARB UR QL SCN: NOT DETECTED
ZOLPIDEM UR QL SCN: NOT DETECTED
ZOPICLONE-N-OXIDE UR-MCNC: NOT DETECTED NG/ML